# Patient Record
(demographics unavailable — no encounter records)

---

## 2024-10-15 NOTE — PHYSICAL EXAM
[Right] : right foot and ankle [NL (40)] : plantar flexion 40 degrees [NL 30)] : inversion 30 degrees [NL (20)] : eversion 20 degrees [5___] : eversion 5[unfilled]/5 [2+] : dorsalis pedis pulse: 2+ [Left] : left foot and ankle [] : no dorsolateral foot tenderness [FreeTextEntry8] : ttp medial gastroc [TWNoteComboBox7] : dorsiflexion 15 degrees

## 2024-10-15 NOTE — DISCUSSION/SUMMARY
[de-identified] : home exercises encouraged:  Yoga/Pilates/Jm Chi  Used up PT benefits for the year Had good response to ART in past low impact activity

## 2024-10-15 NOTE — HISTORY OF PRESENT ILLNESS
[6] : 6 [4] : 4 [Dull/Aching] : dull/aching [Sharp] : sharp [de-identified] : 6/13/24: Patient is a 38 year old female here for both her ankles. States she has been having pain since 2022 a day after taking ciprofloxacin. States left is worse than right, and pain can shoot into the left calf.  She saw Dr. Estrada orthopedist had 2 PRPs on the left ankle in 8/2023 and 2/2024 (post tib tendon and peroneal tendon) and lidocaine trigger point injections to the left calf. She did intermittent PT for 2 years. Has tried braces, boots and custom orthotics. Had negative rheum workup. Gets massage and acupuncture. There is minimal improvement. Unable to run or exercise. Takes multiple vitamin supplements and she eats a plant based diet.  10/15/2024: NI States she tripped over a tarp 09/19 and jammed L ankle [] : no [FreeTextEntry1] : b/l ankle

## 2024-10-29 NOTE — HEALTH RISK ASSESSMENT
[Yes] : Yes [2 - 3 times a week (3 pts)] : 2 - 3  times a week (3 points) [1 or 2 (0 pts)] : 1 or 2 (0 points) [Never (0 pts)] : Never (0 points) [No] : In the past 12 months have you used drugs other than those required for medical reasons? No [No falls in past year] : Patient reported no falls in the past year [0] : 1) Little interest or pleasure doing things: Not at all (0) [1] : 2) Feeling down, depressed, or hopeless for several days (1) [PHQ-2 Negative - No further assessment needed] : PHQ-2 Negative - No further assessment needed [Never] : Never [HIV test declined] : HIV test declined [Hepatitis C test declined] : Hepatitis C test declined [None] : None [Alone] : lives alone [Employed] : employed [Significant Other] : lives with significant other [# Of Children ___] : has [unfilled] children [Sexually Active] : sexually active [Feels Safe at Home] : Feels safe at home [Fully functional (bathing, dressing, toileting, transferring, walking, feeding)] : Fully functional (bathing, dressing, toileting, transferring, walking, feeding) [Fully functional (using the telephone, shopping, preparing meals, housekeeping, doing laundry, using] : Fully functional and needs no help or supervision to perform IADLs (using the telephone, shopping, preparing meals, housekeeping, doing laundry, using transportation, managing medications and managing finances) [Audit-CScore] : 3 [de-identified] : exercises regularly; walks daily at least 12k steps per day [de-identified] : well balanced, healthy, Vegan [de-identified] : Follows with therapist once per week; has tried medication int he past but did not like SE. [CFB1Qbnps] : 1 [EyeExamDate] : 10/2024 [Patient reported PAP Smear was normal] : Patient reported PAP Smear was normal [Patient reported colonoscopy was normal] : Patient reported colonoscopy was normal [Change in mental status noted] : No change in mental status noted [Language] : denies difficulty with language [High Risk Behavior] : no high risk behavior [PapSmearDate] : 09/2024 [ColonoscopyDate] : 08/2024 [FreeTextEntry2] : high school and

## 2024-10-29 NOTE — HISTORY OF PRESENT ILLNESS
[FreeTextEntry1] : CPE. [de-identified] : Patient is a 39yo female with PMH HLD, SIRENA who presents to the office for CPE.    Last CPE:  06/28/2023, Dr. SHAWN Sahni.  GYN Exam:   09/2024, reportedly normal; Dr. Susan Cardozo. Mammogram:  maternal aunt diagnosed with breast cancer in early 60's, no one else in the family with breast cancer.  Regular breast self-exams encouraged.  Colonoscopy:  08/2024, Dr. De La O; normal; performed for abd pain/bloating/constipation; no known family history of colon cancer.  Ophthalmology:  10/2024, does not wear corrective lenses. Dermatology:  [facility]. Dentist:  [facility]. Flu:  declines. Tdap:  06/2023.  COVID:  received. LMP:  ~3 weeks ago, menstruates regularly, on OCPs.  Pt reports increased fatigue.  States no matter how long she sleeps she continues to feel very fatigued. Over the past year has been having canker sores in her mouth, dry patches of skin on her eyelids. Has also noted some weight gain without increased appetite.

## 2024-10-29 NOTE — ASSESSMENT
[FreeTextEntry1] : Patient is a 39yo female with PMH HLD, SIRENA who presents to the office for CPE.  Health Maintenance - UTD with routine HCM. - Fasting labs drawn in office. - Pt agreeable to receiving results through [facility] messaging.  Pt advised if they do not hear from the office within the next week, or if they have difficulty opening their [facility] message, they should call the office to review results. - EKG performed in office NSR, no acute ST/T changes, no arrhythmias.  Pt denies cardiac complaints. - Eat plenty of fruits and vegetables, especially deeply colored fruits/vegetables (such as leafy greens, peaches) that are more nutrient-dense.  Continue to work hard on diet and exercise, limiting/avoiding saturated fat, fatty foods, greasy foods, red meats, white flour-based carbohydrates (cookies, cakes, white bread, white rice), and added sugars.  Chose whole grain foods and products made with whole grains over refined grains and white flour-based carbohydrates.  Avoid beverages and food with added sugar.  Limit salt intake to improve blood pressure.  Limit alcohol intake. - Try and incorporate 30 minutes of aerobic exercise to your daily routine.  Fatigue - Labs drawn in office. - Adequate hydration, healthy eating, and regular exercise encouraged. - Further recommendations pending b/w results.  Call the office or go to the ED immediately if you develop new, worsening or concerning symptoms including high fever, severe headache/worst headache of your life, confusion, dizziness/lightheadedness, loss of consciousness, severe chest pain, difficulty breathing, shortness of breath, severe abdominal pain, excessive vomiting/diarrhea, inability to feel/move the extremities, or any other concerning symptoms.

## 2024-11-01 NOTE — PHYSICAL EXAM
[General Appearance - Alert] : alert [Sclera] : the sclera and conjunctiva were normal [Heart Sounds] : normal S1 and S2 [Musculoskeletal - Swelling] : no joint swelling seen [Motor Tone] : muscle strength and tone were normal [] : no rash [FreeTextEntry1] : strong neck flexors, 5/5 strength in bilateral upper and lower extremities

## 2024-11-01 NOTE — HISTORY OF PRESENT ILLNESS
[FreeTextEntry1] : Rheumatology Consultation Note   Chief Complaint: Positive RF and elevated inflammatory markers    History of Present Illness: YEIMY MILES is a 38 year old woman PMH basal cell CA s/p MOH's 2015, depression and anxiety, going to  who presents with elevated RF and inflammatory markers.   Feeling not herself recently, very fatigued, this past July broke into a full body rash. Both eyelids had rashes, been there for 3 weeks. Not triggered by sun-exposure. No photo-sensitivity. Body rash has resolved.   Fatigue worse over the past few months, nothing inciting it.   Has pain in from elbow to wrist. No swelling there. No stiffness. Pain is intermittent. Tyleonl as needed helps. No swelling in the hands.   Hips down to the feet feel stiff. No knee swelling. Stiffness with overexertion gets worse, initially gets better. In the morning though has stiffness but gets better with activity. At rest, no stiffness. Just tylenol and tiger elizabeth as needed for pain, helps a little.   Ankle swelling in the left foot comes and goes, started after the flouroquinole exposure.   Been getting lower back and neck pain, but pain doesn't bother her. Currently works as an  using hands and walking all the time.   Hasn't tolerated steroids anymore, got in the past for bronchitis she says, get pain all over body, maybe from prednisone?   Used to get sinus infections as a child, not in 30's. No ear infections. Sensation is intact. No pitting in the nails.   Naproxen, advil, meloxicam, aspirin make her tendon's hurt. Hasn't tired voltaren gel or topicals b/c scared it might cause her tendons to hurt.   PAP smears been normal  Colonoscopy in August 2024 normal   Family hx: Aunt has polymyositis she's on therapy   Inflammatory arthritis ROS negative for symmetrical peripheral joint synovitis, dactylitis, psoriasis/ rashes, eye inflammation, inflammatory low back pain, IBD. SLE ROS negative for oral ulcers, facial rash, chest pain, abdominal pain, hair loss, Raynaud's, joint swelling, unexplained numbness or weakness, seizures, frothy urine or hematuria. APLS ROS negative for uncomplicated pregnancies, no miscarriage, no thrombotic events.

## 2024-11-01 NOTE — REVIEW OF SYSTEMS
[Negative] : Heme/Lymph [Joint Pain] : joint pain [Joint Swelling] : joint swelling [FreeTextEntry3] : eye rash on eye lid

## 2024-11-01 NOTE — ASSESSMENT
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   Based on available history, exam, and diagnostics the patient should be further evaluated for a systemic rheumatologic disease. Will start off with further lab work and imaging.  She showed me a picture on her phone of her eye rash, it looks suspicious for a heliotrope like rash but recent LFT's normal and CPK checked in 2023 was normal and strength normal in all extremities. I have asked her in any case to have it examined by her outside dermatologist and to let me know the results.     Plan: -follow up labs including MICHAEL subsets, ID screening labs, CPK, aldolase, SPEP  -follow up x-rays of hands, knees, feet  -pt to follow up with outside derm of her eyelid rash  -for now can continue tylenol as needed or daily for pain given it is helping her and she is allergic to NSAIDS  -continue to do home exercises until her PT sessions renew for flouoquinole tendonitis     All questions and concerns addressed to my best possible ability, patient verbalizes understanding and is agreeable.   RTC on 11/27 for a video visit

## 2024-11-01 NOTE — ASSESSMENT
[FreeTextEntry1] : Assessment: #Positive RF with elevated ESR and CRP  #Persistent fatigue  #Elbow and wrist pain  -no synovitis on exam -uses tylenol as needed for pain which helps   #Flouroquinolone tendonitis of the posterior tibial tendon started in 2022   #Plantar fasciitis  #Achilles Tendonitis  -uses orthotics   #Bilateral eyelid rash  -needs to see her outside dermatologist   Discussion: YEIYM MILES is a 38 year old woman PMH basal cell CA s/p MOH's 2015, depression and anxiety, going to  who presents with elevated RF and inflammatory markers.   Based on available history, exam, and diagnostics the patient should be further evaluated for a systemic rheumatologic disease. Will start off with further lab work and imaging.  She showed me a picture on her phone of her eye rash, it looks suspicious for a heliotrope like rash but recent LFT's normal and CPK checked in 2023 was normal and strength normal in all extremities. I have asked her in any case to have it examined by her outside dermatologist and to let me know the results.     Plan: -follow up labs including MICHAEL subsets, ID screening labs, CPK, aldolase, SPEP  -follow up x-rays of hands, knees, feet  -pt to follow up with outside derm of her eyelid rash  -for now can continue tylenol as needed or daily for pain given it is helping her and she is allergic to NSAIDS  -continue to do home exercises until her PT sessions renew for flouoquinole tendonitis     All questions and concerns addressed to my best possible ability, patient verbalizes understanding and is agreeable.   RTC on 11/27 for a video visit

## 2024-11-01 NOTE — REASON FOR VISIT
[Initial Evaluation] : an initial evaluation [FreeTextEntry1] : Elevated inflammatory markers and RF

## 2024-11-01 NOTE — DATA REVIEWED
[FreeTextEntry1] : Available notes, and pertinent labs & imaging in EMR reviewed. Paper records reviewed, scanned to EMR. Pertinent labs and imaging summarized in HPI or A/P.  Labs: 10/2024 labs  RF elevated to 45 ESR elevated to 28 and CRP elevated to 10   Imaging: MRI L Spine 10/2022 Impression:  Unremarkable Lumbar MRI

## 2024-11-27 NOTE — REVIEW OF SYSTEMS
[Joint Pain] : joint pain [Joint Swelling] : joint swelling [Negative] : Heme/Lymph [FreeTextEntry3] : eye rash on eye lid

## 2024-11-27 NOTE — ASSESSMENT
Detail Level: Generalized 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   #Plantar fasciitis  #Achilles Tendonitis  -uses orthotics   #Bilateral eyelid rash  -saw outside dermatologist and was deemed 2/2 to contact dermatitis from make up   #Positive weak dsDNA but negative MICHAEL and ROS for SLE -likely false positive result   Discussion: YEIMY MILES is a 38 year old woman PMH basal cell CA s/p MOH's 2015, depression and anxiety, going to  who presents with elevated RF and inflammatory markers.   Based on available history, exam, and diagnostics patient with a positive RF but given negative CCP and xrays at this time does not meet diagnostic criteria for RA, however given the high titer of the RF will continue to monitor patient in case she were to develop any new symptoms that would necessitate immunosuppressive therapy.   Her eyelid rash was deemed 2/2 to contact dermatitis, not heliotrope rash and her muscle enzymes and liver tests were normal.   For her bilateral elbow to wrist pain, not consistent with intraarticular process as x-rays normal. Considering nerve impingement? at next visit will see if pt still has symptoms and can consider EMG/NCS to rule out nerve impingement.     Plan: -for now can continue tylenol as needed or daily for pain given it is helping her and she is allergic to NSAIDS  -continue to do home exercises until her PT sessions renew for flouoquinole tendonitis, i have asked her to bring in prior MRI's of her feet to see if any erosions or tenosynovitis noted  -can consider EMG/NCS in future for pain radiating from elbows to wrists     All questions and concerns addressed to my best possible ability, patient verbalizes understanding and is agreeable.   RTC in 4 months

## 2024-11-27 NOTE — REASON FOR VISIT
[Home] : at home, [unfilled] , at the time of the visit. [Medical Office: ([facility])___] : at the medical office located in  [Verbal consent obtained from patient] : the patient, [unfilled] [Follow-Up: _____] : a [unfilled] follow-up visit [FreeTextEntry1] : joint pain and positive RF

## 2024-11-27 NOTE — HISTORY OF PRESENT ILLNESS
[FreeTextEntry1] : Rheumatology Consultation Note   Chief Complaint: Positive RF and elevated inflammatory markers    Brief Rheumatological Hx: YEIMY MILES is a 38 year old woman PMH basal cell CA s/p MOH's 2015, depression and anxiety, going to  who presents with elevated RF and inflammatory markers.   Feeling not herself recently, very fatigued, this past July broke into a full body rash. Both eyelids had rashes, been there for 3 weeks. Not triggered by sun-exposure. No photo-sensitivity. Body rash has resolved.   Fatigue worse over the past few months, nothing inciting it.   Has pain in from elbow to wrist. No swelling there. No stiffness. Pain is intermittent. Tyleonl as needed helps. No swelling in the hands.   Hips down to the feet feel stiff. No knee swelling. Stiffness with overexertion gets worse, initially gets better. In the morning though has stiffness but gets better with activity. At rest, no stiffness. Just tylenol and tiger elizabeth as needed for pain, helps a little.   Ankle swelling in the left foot comes and goes, started after the flouroquinole exposure.   Been getting lower back and neck pain, but pain doesn't bother her. Currently works as an  using hands and walking all the time.   Hasn't tolerated steroids anymore, got in the past for bronchitis she says, get pain all over body, maybe from prednisone?   Used to get sinus infections as a child, not in 30's. No ear infections. Sensation is intact. No pitting in the nails.   Naproxen, advil, meloxicam, aspirin make her tendon's hurt. Hasn't tired voltaren gel or topicals b/c scared it might cause her tendons to hurt.   PAP smears been normal  Colonoscopy in August 2024 normal   Family hx: Aunt has polymyositis she's on therapy   11/27: Patient saw dermatologist outside last week and eyelid rash he felt more consistent with contact dermatitis from a prior make up she was using, not heliotrope rash. Still with pain from elbow to wrists but feels more so muscle, not joint itself. Still doing home PT exercises for achilles tendonitis.   Inflammatory arthritis ROS negative for symmetrical peripheral joint synovitis, dactylitis, psoriasis/ rashes, eye inflammation, inflammatory low back pain, IBD. SLE ROS negative for oral ulcers, facial rash, chest pain, abdominal pain, hair loss, Raynaud's, joint swelling, unexplained numbness or weakness, seizures, frothy urine or hematuria. APLS ROS negative for uncomplicated pregnancies, no miscarriage, no thrombotic events.

## 2024-11-27 NOTE — PHYSICAL EXAM
[General Appearance - Alert] : alert [General Appearance - In No Acute Distress] : in no acute distress [PERRL With Normal Accommodation] : pupils were equal in size, round, and reactive to light [Neck Appearance] : the appearance of the neck was normal [] : no respiratory distress

## 2024-11-27 NOTE — DATA REVIEWED
[FreeTextEntry1] : Available notes, and pertinent labs & imaging in EMR reviewed. Paper records reviewed, scanned to EMR. Pertinent labs and imaging summarized in HPI or A/P.  Labs: 10/2024 labs  RF elevated to 45 ESR elevated to 28 and CRP elevated to 10   11/18 labs Negative MICHAEL, CCP, [id number], Quantiferon, Hepatitis B and C serologies, HIV, anti smith, anti rnp, SSA/SSB, CK, aldolase, C3, SPEP, UA, UPC  C4 elevated to 40 dsDNA elevated to 8   Imaging: MRI L Spine 10/2022 Impression:  Unremarkable Lumbar MRI   11/19 x-rays  feet IMPRESSION: No fractures or dislocations. Tarsometatarsal alignment maintained without evidence for a Lisfranc injury. 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 Preserved remaining visualized joint spaces and no joint margin erosions. Bilateral type I accessory navicular ossicles. No calcaneal spurring and unremarkable distal Achilles tendon shadow. No lytic or blastic lesions.  knees IMPRESSION: Trace left effusion. No gross right effusion. No fractures or dislocations. Preserved joint spaces with smooth and intact articular surfaces. No joint margin erosions or intra-articular or periarticular calcifications. Unremarkable quadriceps and patellar tendon shadows. No destructive osseous lesions or periosteal reaction.  hands IMPRESSION: No fractures dislocations or osseous or joint deformities. Preserved joint spaces and no joint margin erosions. Carpal bones normally aligned. Neutral ulnar variance. No lytic or blastic lesions.  elbows IMPRESSION: No fractures, dislocations, or evidence for joint effusions. Preserved joint spaces, smooth articular margins, and no intra-articular loose bodies. No joint margin erosions. No destructive osseous lesions or periosteal reaction. Unremarkable soft tissues.

## 2024-11-27 NOTE — ASSESSMENT
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   Based on available history, exam, and diagnostics patient with a positive RF but given negative CCP and xrays at this time does not meet diagnostic criteria for RA, however given the high titer of the RF will continue to monitor patient in case she were to develop any new symptoms that would necessitate immunosuppressive therapy.   Her eyelid rash was deemed 2/2 to contact dermatitis, not heliotrope rash and her muscle enzymes and liver tests were normal.   For her bilateral elbow to wrist pain, not consistent with intraarticular process as x-rays normal. Considering nerve impingement? at next visit will see if pt still has symptoms and can consider EMG/NCS to rule out nerve impingement.     Plan: -for now can continue tylenol as needed or daily for pain given it is helping her and she is allergic to NSAIDS  -continue to do home exercises until her PT sessions renew for flouoquinole tendonitis, i have asked her to bring in prior MRI's of her feet to see if any erosions or tenosynovitis noted  -can consider EMG/NCS in future for pain radiating from elbows to wrists     All questions and concerns addressed to my best possible ability, patient verbalizes understanding and is agreeable.   RTC in 4 months

## 2024-11-27 NOTE — REASON FOR VISIT
[Home] : at home, [unfilled] , at the time of the visit. [Medical Office: ([facility])___] : at the medical office located in  [Verbal consent obtained from patient] : the patient, [unfilled] [Follow-Up: _____] : a [unfilled] follow-up visit [FreeTextEntry1] : joint pain and positive RF

## 2025-03-25 NOTE — HISTORY OF PRESENT ILLNESS
[FreeTextEntry1] : 3/25/25: Ms. Ann presents today for neurology evaluation. She reports that she has been experiencing headaches almost every day for a few months. Headaches started in her mid 20s. Over the last few months she has also developed auras. She describes the aura as a strobe light, usually in her right eye, lasting 5-10 minutes. This is followed by a headache with photophobia and nausea. She takes acetaminophen although it does not help. Usually the headache goes away when she goes to sleep. On most days she does not have a headache upon awakening but it comes back the next day.  She has headaches that are not migraines on other days. She does not tolerate ibuprofen.  She sleeps for about 5 hours per night. She takes naps during the day. She is not aware of any food triggers. Sometimes headaches are more frequent during her period.  She is taking estrogen containing birth control. She may start Vyvanse for recently diagnosed ADHD.  She does take magnesium glycinate 400 mg/day.  Her father had a stroke and had dementia.

## 2025-03-25 NOTE — HISTORY OF PRESENT ILLNESS
[FreeTextEntry1] : 3/25/25: Ms. Ann presents today for neurology evaluation. She reports that she has been experiencing headaches almost every day for a few months. Headaches started in her mid 20s. Over the last few months she has also developed auras. She describes the aura as a strobe light, usually in her right eye, lasting 5-10 minutes. This is followed by a headache with photophobia and nausea. She takes acetaminophen although it does not help. Usually the headache goes away when she goes to sleep. On most days she does not have a headache upon awakening but it comes back the next day.  She has headaches that are not migraines on other days. She does not tolerate ibuprofen.  She sleeps for about 5 hours per night. She takes naps during the day. She is not aware of any food triggers. Sometimes headaches are more frequent during her period.  She is taking estrogen containing birth control. She may start Vyvanse for recently diagnosed ADHD.  She does take magnesium glycinate 400 mg/day.  Her father had a stroke and had dementia.    Ivermectin Pregnancy And Lactation Text: This medication is Pregnancy Category C and it isn't known if it is safe during pregnancy. It is also excreted in breast milk.

## 2025-03-25 NOTE — DISCUSSION/SUMMARY
[FreeTextEntry1] : Tasha Ann is a 39 year old woman with a long history of headaches. She has migraines with aura and now she likely also has rebound headaches. She has a non-focal neurological examination.  Headaches: -Migraines as well as rebound headache from overuse of acetaminophen -Given the frequency of her headaches, we discussed various options of preventative medications including topiramate, Qulipta, nortriptyline, propranolol.  I advised her that the first 2 or more specific for migraines while the latter 2 can be used for both migraines as well as other headache types. She is hesitant to start a daily medication at this time.  She may be starting Vyvanse in the near future for recently diagnosed ADHD. Samples of Qulipta given in case she would like to start this. -If she is not interested in starting a medication for daily prevention, I do suggest that she reduce the use of acetaminophen. -We also discussed abortive options.  I am sending a prescription for rizatriptan MLT 10 mg to her pharmacy.  This works best when taken early on.  We discussed potential side effects.  The dose can be repeated x1 after two hours if needed. -I am also giving her samples of Nurtec ODT 75 mg which can be tried for a migraine. This should only be taken once in a 24 hour period.  I am ordering a baseline MRI of the brain with and without contrast.  We discussed the risk of stroke in women who have migraine with aura and take estrogen containing oral contraceptives.  She should talk to her gynecologist about alternative methods of contraception.  routine f/u will be scheduled.

## 2025-04-03 NOTE — HISTORY OF PRESENT ILLNESS
[FreeTextEntry1] : Rheumatology Consultation Note   Chief Complaint: Positive RF and elevated inflammatory markers    Brief Rheumatological Hx: YEIMY MILES is a 38 year old woman PMH basal cell CA s/p MOH's 2015, depression and anxiety, going to  who presents with elevated RF and inflammatory markers.   Feeling not herself recently, very fatigued, this past July broke into a full body rash. Both eyelids had rashes, been there for 3 weeks. Not triggered by sun-exposure. No photo-sensitivity. Body rash has resolved.   Fatigue worse over the past few months, nothing inciting it.   Has pain in from elbow to wrist. No swelling there. No stiffness. Pain is intermittent. Tyleonl as needed helps. No swelling in the hands.   Hips down to the feet feel stiff. No knee swelling. Stiffness with overexertion gets worse, initially gets better. In the morning though has stiffness but gets better with activity. At rest, no stiffness. Just tylenol and tiger elizabeth as needed for pain, helps a little. Ankle swelling in the left foot comes and goes, started after the flouroquinole exposure.   Been getting lower back and neck pain, but pain doesn't bother her. Currently works as an  using hands and walking all the time.   Hasn't tolerated steroids anymore, got in the past for bronchitis she says, get pain all over body, maybe from prednisone?   Used to get sinus infections as a child, not in 30's. No ear infections. Sensation is intact. No pitting in the nails.   Naproxen, advil, meloxicam, aspirin make her tendon's hurt. Hasn't tired voltaren gel or topicals b/c scared it might cause her tendons to hurt.   PAP smears been normal  Colonoscopy in August 2024 normal   Family hx: Aunt has polymyositis she's on therapy   11/27/24: Patient saw dermatologist outside last week and eyelid rash he felt more consistent with contact dermatitis from a prior make up she was using, not heliotrope rash. Still with pain from elbow to wrists but feels more so muscle, not joint itself. Still doing home PT exercises for achilles tendinitis.   4/3/25: As of now patient is having improvement in terms of her tendinitis of her achilles with her PT program, is pleased by it. Saw neurology recently for migraine headaches. Says the pain form her elbows to hands from last visit has improved on its own. Otherwise has no new complaints.   Inflammatory arthritis ROS negative for symmetrical peripheral joint synovitis, dactylitis, psoriasis/ rashes, eye inflammation, inflammatory low back pain, IBD. SLE ROS negative for oral ulcers, facial rash, chest pain, abdominal pain, hair loss, Raynaud's, joint swelling, unexplained numbness or weakness, seizures, frothy urine or hematuria. APLS ROS negative for uncomplicated pregnancies, no miscarriage, no thrombotic events.

## 2025-04-03 NOTE — ASSESSMENT
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  -got PRP injections last in 2024 by outside orthopedics, also saw 2 other orthopedic doctors and all recommended supportive treatment  -doing PT currently   #Plantar fasciitis  #Achilles Tendonitis  -uses orthotics   #Bilateral eyelid rash  -saw outside dermatologist and was deemed 2/2 to contact dermatitis from make up   #Positive weak dsDNA but negative MICHAEL and ROS for SLE -likely false positive result, will check crithidia   Discussion: YEIMY MILES is a 38 year old woman PMH basal cell CA s/p MOH's 2015, depression and anxiety, going to  who presents with elevated RF and inflammatory markers.   Based on available history, exam, and diagnostics patient with a positive RF but given negative CCP and xrays at this time does not meet diagnostic criteria for RA, however given the high titer of the RF will continue to monitor patient in case she were to develop any new symptoms that would necessitate immunosuppressive therapy.   She will continue PT for her achilles tendinitis.   Plan: -will plan to repeat basic labs and check crithidia by DNA -for now can continue tylenol as needed or daily for pain given it is helping her and she is allergic to NSAIDS  -continue PT for flouoquinole tendonitis    All questions and concerns addressed to my best possible ability, patient verbalizes understanding and is agreeable.   RTC in 3 months via video visit

## 2025-04-03 NOTE — DATA REVIEWED
[FreeTextEntry1] : Available notes, and pertinent labs & imaging in EMR reviewed. Paper records reviewed, scanned to EMR. Pertinent labs and imaging summarized in HPI or A/P.  Labs: 10/2024 labs  RF elevated to 45 ESR elevated to 28 and CRP elevated to 10   11/18 labs Negative IMCHAEL, CCP, HLAB27, Quantiferon, Hepatitis B and C serologies, HIV, anti smith, anti rnp, SSA/SSB, CK, aldolase, C3, SPEP, UA, UPC  C4 elevated to 40 dsDNA elevated to 8   Imaging: MRI L Spine 10/2022 Impression:  Unremarkable Lumbar MRI   11/19 x-rays  feet IMPRESSION: No fractures or dislocations. Tarsometatarsal alignment maintained without evidence for a Lisfranc injury. 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 Preserved remaining visualized joint spaces and no joint margin erosions. Bilateral type I accessory navicular ossicles. No calcaneal spurring and unremarkable distal Achilles tendon shadow. No lytic or blastic lesions.  knees IMPRESSION: Trace left effusion. No gross right effusion. No fractures or dislocations. Preserved joint spaces with smooth and intact articular surfaces. No joint margin erosions or intra-articular or periarticular calcifications. Unremarkable quadriceps and patellar tendon shadows. No destructive osseous lesions or periosteal reaction.  hands IMPRESSION: No fractures dislocations or osseous or joint deformities. Preserved joint spaces and no joint margin erosions. Carpal bones normally aligned. Neutral ulnar variance. No lytic or blastic lesions.  elbows IMPRESSION: No fractures, dislocations, or evidence for joint effusions. Preserved joint spaces, smooth articular margins, and no intra-articular loose bodies. No joint margin erosions. No destructive osseous lesions or periosteal reaction. Unremarkable soft tissues.

## 2025-04-03 NOTE — PHYSICAL EXAM
[General Appearance - Alert] : alert [General Appearance - In No Acute Distress] : in no acute distress [PERRL With Normal Accommodation] : pupils were equal in size, round, and reactive to light [Neck Appearance] : the appearance of the neck was normal [Musculoskeletal - Swelling] : no joint swelling seen [Motor Tone] : muscle strength and tone were normal [FreeTextEntry1] : no synovitis  [] : no rash

## 2025-07-02 NOTE — PHYSICAL EXAM
[Normal] : affect was normal and insight and judgment were intact [de-identified] : tenderness with palpation over left neck and shoulder, muscle spasm

## 2025-07-02 NOTE — PLAN
[FreeTextEntry1] : - could be due to the hormones, as well as previous neck injury  - recommend to get back in with acupuncture as that was successful in the past - trial heating pad  - slow symmetrical stretches  - make sure to stay well hydrated  - proper posture throughout the day  - comfortable pillow/sleeping position  - if develop worsening symptoms, worst headache of your life or vision changes - go to the ER

## 2025-07-02 NOTE — REVIEW OF SYSTEMS
[Headache] : headache [Dizziness] : no dizziness [Negative] : Psychiatric [FreeTextEntry9] : see HPI

## 2025-07-14 NOTE — REASON FOR VISIT
[Follow-Up: _____] : a [unfilled] follow-up visit [Home] : at home, [unfilled] , at the time of the visit. [Medical Office: ([facility])___] : at the medical office located in  [Telehealth (audio & video)] : This visit was provided via telehealth using real-time 2-way audio visual technology. [Verbal consent obtained from patient] : the patient, [unfilled] [FreeTextEntry1] : joint pain and positive RF

## 2025-07-14 NOTE — ASSESSMENT
[FreeTextEntry1] : Assessment: #Positive RF with elevated ESR and CRP  #Persistent fatigue  #Elbow and wrist pain  -no synovitis on exam -negative CCP along with MICHAEL and MICHAEL subsets, CK, Aldolase, SPEP, UA, UPC -uses tylenol as needed for pain which helps   #Flouroquinolone tendonitis of the left posterior tibial tendon started in 2022  -got PRP injections last in 2024 by outside orthopedics, also saw 2 other orthopedic doctors and all recommended supportive treatment  -finished PT April 2025 with improvement in symptoms   #Plantar fasciitis  #Achilles Tendonitis  -uses orthotics   #Bilateral eyelid rash  -saw outside dermatologist and was deemed 2/2 to contact dermatitis from make up   #Positive weak dsDNA but negative MICHAEL and ROS for SLE -likely false positive result, will check crithidia   Discussion: YEIMY MILES is a 38 year old woman PMH basal cell CA s/p MOH's 2015, depression and anxiety, going to  who presents with elevated RF and inflammatory markers.   Based on available history, exam, and diagnostics patient with a positive RF but given negative CCP and xrays at this time does not meet diagnostic criteria for RA, however given the high titer of the RF will continue to monitor patient in case she were to develop any new symptoms that would necessitate immunosuppressive therapy.   Plan: -follow up crithidia by DNA -for now can continue tylenol as needed or daily for pain given it is helping her and she is allergic to NSAIDS  -can place another PT order in the future if patient requests  -if pt were to develop new inflammatory symptoms, would consider ultrasound of affected joints     All questions and concerns addressed to my best possible ability, patient verbalizes understanding and is agreeable.   RTC in 6 months in person with labs done prior

## 2025-07-14 NOTE — REASON FOR VISIT
[Follow-Up: _____] : a [unfilled] follow-up visit [Home] : at home, [unfilled] , at the time of the visit. [Medical Office: ([facility])___] : at the medical office located in  [Telehealth (audio & video)] : This visit was provided via telehealth using real-time 2-way audio visual technology. [Verbal consent obtained from patient] : the patient, [unfilled] [FreeTextEntry1] : joint pain and positive RF

## 2025-07-14 NOTE — HISTORY OF PRESENT ILLNESS
[FreeTextEntry1] : Rheumatology Consultation Note   Chief Complaint: Positive RF and elevated inflammatory markers    Brief Rheumatological Hx: YEIMY MILES is a 38 year old woman PMH basal cell CA s/p MOH's 2015, depression and anxiety, going to  who presents with elevated RF and inflammatory markers.   Feeling not herself recently, very fatigued, this past July broke into a full body rash. Both eyelids had rashes, been there for 3 weeks. Not triggered by sun-exposure. No photo-sensitivity. Body rash has resolved.   Fatigue worse over the past few months, nothing inciting it.   Has pain in from elbow to wrist. No swelling there. No stiffness. Pain is intermittent. Tyleonl as needed helps. No swelling in the hands.   Hips down to the feet feel stiff. No knee swelling. Stiffness with overexertion gets worse, initially gets better. In the morning though has stiffness but gets better with activity. At rest, no stiffness. Just tylenol and tiger elizabeth as needed for pain, helps a little. Ankle swelling in the left foot comes and goes, started after the flouroquinole exposure.   Been getting lower back and neck pain, but pain doesn't bother her. Currently works as an  using hands and walking all the time.   Hasn't tolerated steroids anymore, got in the past for bronchitis she says, get pain all over body, maybe from prednisone?   Used to get sinus infections as a child, not in 30's. No ear infections. Sensation is intact. No pitting in the nails.   Naproxen, advil, meloxicam, aspirin make her tendon's hurt. Hasn't tired voltaren gel or topicals b/c scared it might cause her tendons to hurt.   PAP smears been normal  Colonoscopy in August 2024 normal   Family hx: Aunt has polymyositis she's on therapy   11/27/24: Patient saw dermatologist outside last week and eyelid rash he felt more consistent with contact dermatitis from a prior make up she was using, not heliotrope rash. Still with pain from elbow to wrists but feels more so muscle, not joint itself. Still doing home PT exercises for achilles tendinitis.   4/3/25: As of now patient is having improvement in terms of her tendinitis of her achilles with her PT program, is pleased by it. Saw neurology recently for migraine headaches. Says the pain form her elbows to hands from last visit has improved on its own. Otherwise has no new complaints.   7/14/25: Finished PT of left foot in April and actually feels better afterwards. Still using orthotics for plantar fasciitis. Has been following with neurology for headaches. No additional symptoms.   Inflammatory arthritis ROS negative for symmetrical peripheral joint synovitis, dactylitis, psoriasis/ rashes, eye inflammation, inflammatory low back pain, IBD. SLE ROS negative for oral ulcers, facial rash, chest pain, abdominal pain, hair loss, Raynaud's, joint swelling, unexplained numbness or weakness, seizures, frothy urine or hematuria. APLS ROS negative for uncomplicated pregnancies, no miscarriage, no thrombotic events.

## 2025-07-14 NOTE — REASON FOR VISIT
[Follow-Up: _____] : a [unfilled] follow-up visit [Home] : at home, [unfilled] , at the time of the visit. [Medical Office: ([facility])___] : at the medical office located in  [Telehealth (audio & video)] : This visit was provided via telehealth using real-time 2-way audio visual technology. [Verbal consent obtained from patient] : the patient, [unfilled] [FreeTextEntry1] : joint pain and positive RF